# Patient Record
Sex: MALE | Race: WHITE | URBAN - METROPOLITAN AREA
[De-identification: names, ages, dates, MRNs, and addresses within clinical notes are randomized per-mention and may not be internally consistent; named-entity substitution may affect disease eponyms.]

---

## 2017-04-06 DIAGNOSIS — F33.0 MAJOR DEPRESSIVE DISORDER, RECURRENT EPISODE, MILD (H): ICD-10-CM

## 2017-04-06 NOTE — TELEPHONE ENCOUNTER
sertraline (ZOLOFT) 50 MG tablet     Last Written Prescription Date: 12/05/16  Last Fill Quantity: 90, # refills: 3  Last Office Visit with [facility] primary care provider:  12/05/16        Last PHQ-9 score on record=   PHQ-9 SCORE 12/5/2016   Total Score 0       Patient would like a short supply to go to:       [facility] [address] [place] [address]      As he is almost out and mail order pharmacy wont get it to him fast enough.

## 2017-04-06 NOTE — TELEPHONE ENCOUNTER
Wife calls, CTC on file, needs short term sent locally and sent to mail order ([facility]), has refills  Prescription approved per [facility] Refill Protocol.  Milagros Young RN, BSN

## 2017-04-12 ENCOUNTER — TRANSFERRED RECORDS (OUTPATIENT)
Dept: HEALTH INFORMATION MANAGEMENT | Facility: CLINIC | Age: 67
End: 2017-04-12

## 2017-07-27 ENCOUNTER — OFFICE VISIT (OUTPATIENT)
Dept: FAMILY MEDICINE | Facility: CLINIC | Age: 67
End: 2017-07-27
Payer: COMMERCIAL

## 2017-07-27 VITALS
SYSTOLIC BLOOD PRESSURE: 130 MMHG | HEIGHT: 70 IN | RESPIRATION RATE: 12 BRPM | DIASTOLIC BLOOD PRESSURE: 80 MMHG | BODY MASS INDEX: 31.35 KG/M2 | WEIGHT: 219 LBS | HEART RATE: 53 BPM | OXYGEN SATURATION: 98 % | TEMPERATURE: 97.7 F

## 2017-07-27 DIAGNOSIS — E78.5 HYPERLIPIDEMIA LDL GOAL <100: ICD-10-CM

## 2017-07-27 DIAGNOSIS — F33.0 MAJOR DEPRESSIVE DISORDER, RECURRENT EPISODE, MILD (H): ICD-10-CM

## 2017-07-27 DIAGNOSIS — R73.03 PREDIABETES: ICD-10-CM

## 2017-07-27 DIAGNOSIS — I10 ESSENTIAL HYPERTENSION, BENIGN: ICD-10-CM

## 2017-07-27 DIAGNOSIS — K59.00 CONSTIPATION, UNSPECIFIED CONSTIPATION TYPE: Primary | ICD-10-CM

## 2017-07-27 DIAGNOSIS — Z23 NEED FOR PNEUMOCOCCAL VACCINATION: ICD-10-CM

## 2017-07-27 DIAGNOSIS — N28.9 RENAL IMPAIRMENT: ICD-10-CM

## 2017-07-27 PROCEDURE — 90670 PCV13 VACCINE IM: CPT | Performed by: FAMILY MEDICINE

## 2017-07-27 PROCEDURE — 99214 OFFICE O/P EST MOD 30 MIN: CPT | Performed by: FAMILY MEDICINE

## 2017-07-27 PROCEDURE — G0009 ADMIN PNEUMOCOCCAL VACCINE: HCPCS | Performed by: FAMILY MEDICINE

## 2017-07-27 RX ORDER — LOSARTAN POTASSIUM 25 MG/1
25 TABLET ORAL DAILY
Qty: 90 TABLET | Refills: 3 | Status: SHIPPED | OUTPATIENT
Start: 2017-07-27 | End: 2017-09-14

## 2017-07-27 RX ORDER — PRAVASTATIN SODIUM 40 MG
40 TABLET ORAL DAILY
Qty: 90 TABLET | Refills: 3 | Status: SHIPPED | OUTPATIENT
Start: 2017-07-27

## 2017-07-27 ASSESSMENT — ANXIETY QUESTIONNAIRES
2. NOT BEING ABLE TO STOP OR CONTROL WORRYING: NOT AT ALL
5. BEING SO RESTLESS THAT IT IS HARD TO SIT STILL: NOT AT ALL
7. FEELING AFRAID AS IF SOMETHING AWFUL MIGHT HAPPEN: NOT AT ALL
6. BECOMING EASILY ANNOYED OR IRRITABLE: NOT AT ALL
1. FEELING NERVOUS, ANXIOUS, OR ON EDGE: NOT AT ALL
3. WORRYING TOO MUCH ABOUT DIFFERENT THINGS: NOT AT ALL
GAD7 TOTAL SCORE: 0
IF YOU CHECKED OFF ANY PROBLEMS ON THIS QUESTIONNAIRE, HOW DIFFICULT HAVE THESE PROBLEMS MADE IT FOR YOU TO DO YOUR WORK, TAKE CARE OF THINGS AT HOME, OR GET ALONG WITH OTHER PEOPLE: NOT DIFFICULT AT ALL

## 2017-07-27 ASSESSMENT — PATIENT HEALTH QUESTIONNAIRE - PHQ9: 5. POOR APPETITE OR OVEREATING: NOT AT ALL

## 2017-07-27 NOTE — MR AVS SNAPSHOT
After Visit Summary   7/27/2017    Jez Moise    MRN: [id number]           Patient Information     Date Of Birth          1950        Visit Information        Provider Department      7/27/2017 10:20 AM Tammie Crenshaw,  [facility]        Today's Diagnoses     Constipation, unspecified constipation type    -  1    Major depressive disorder, recurrent episode, mild (H)        Essential hypertension, benign        Renal impairment        Hyperlipidemia LDL goal <100        Prediabetes           Follow-ups after your visit        Who to contact     If you have questions or need follow up information about today's clinic visit or your schedule please contact [facility] directly at [phone].  Normal or non-critical lab and imaging results will be communicated to you by MyChart, letter or phone within 4 business days after the clinic has received the results. If you do not hear from us within 7 days, please contact the clinic through [employer] or phone. If you have a critical or abnormal lab result, we will notify you by phone as soon as possible.  Submit refill requests through [employer] or call your pharmacy and they will forward the refill request to us. Please allow 3 business days for your refill to be completed.          Additional Information About Your Visit        MyChart Information     [employer] gives you secure access to your electronic health record. If you see a primary care provider, you can also send messages to your care team and make appointments. If you have questions, please call your primary care clinic.  If you do not have a primary care provider, please call [phone] and they will assist you.        Care EveryWhere ID     This is your Care EveryWhere ID. This could be used by other organizations to access your [facility] medical records  [id number]        Your Vitals Were     Pulse Temperature Respirations Height Pulse Oximetry  "BMI (Body Mass Index)    53 97.7  F (36.5  C) (Oral) 12 5' 9.5\" (1.765 m) 98% 31.88 kg/m2       Blood Pressure from Last 3 Encounters:   07/27/17 130/80   12/05/16 116/78   07/08/16 90/70    Weight from Last 3 Encounters:   07/27/17 219 lb (99.3 kg)   12/05/16 219 lb (99.3 kg)   07/08/16 211 lb (95.7 kg)              We Performed the Following     DEPRESSION ACTION PLAN (DAP)          Today's Medication Changes          These changes are accurate as of: 7/27/17 11:02 AM.  If you have any questions, ask your nurse or doctor.               Stop taking these medicines if you haven't already. Please contact your care team if you have questions.     albuterol 108 (90 BASE) MCG/ACT Inhaler   Commonly known as:  PROAIR HFA/PROVENTIL HFA/VENTOLIN HFA   Stopped by:  Tammie Crenshaw, DO                Where to get your medicines      These medications were sent to [facility] [address]  [address], [facility] [address]     Phone:  [phone]     losartan 25 MG tablet    pravastatin 40 MG tablet    sertraline 50 MG tablet                Primary Care Provider Office Phone # Fax #    Geovanna Rachele Haase, [place] [phone]       [place] [address]        Equal Access to Services     EDIE ADAIR : Hadii carole ku hadasho Soomaali, waaxda luqadaha, qaybta kaalmada adeegyada, waxay doreen flores . So [facility] [phone].    ATENCIÓN: Si chilangola patricia, tiene a shay disposición servicios gratuitos de asistencia lingüística. Ranjan al [phone].    We comply with applicable federal civil rights laws and [place] laws. We do not discriminate on the basis of race, color, national origin, age, disability sex, sexual orientation or gender identity.            Thank you!     Thank you for choosing [facility]  for your care. Our goal is always to provide you with excellent care. " Hearing back from our patients is one way we can continue to improve our services. Please take a few minutes to complete the written survey that you may receive in the mail after your visit with us. Thank you!             Your Updated Medication List - Protect others around you: Learn how to safely use, store and throw away your medicines at www.disposemymeds.org.          This list is accurate as of: 7/27/17 11:02 AM.  Always use your most recent med list.                   Brand Name Dispense Instructions for use Diagnosis    aspirin 81 MG tablet      Take by mouth daily        clobetasol 0.05 % external solution    TEMOVATE          losartan 25 MG tablet    COZAAR    90 tablet    Take 1 tablet (25 mg) by mouth daily    Essential hypertension, benign, Renal impairment       OMEGA-3 FISH OIL PO           order for DME     one    CPAP with medium nasal mask set at 14 cm of H2O pressure for moderate obstructive sleep apnea. Dx:Obstructive Sleep Apnea, 327.23    JOSE (obstructive sleep apnea)       pravastatin 40 MG tablet    PRAVACHOL    90 tablet    Take 1 tablet (40 mg) by mouth daily    Hyperlipidemia LDL goal <100, Renal impairment, Prediabetes       sertraline 50 MG tablet    ZOLOFT    90 tablet    Take 1 tablet (50 mg) by mouth daily 1 tablet orally daily    Major depressive disorder, recurrent episode, mild (H)       VITAMIN D PO      Take 500 mg by mouth

## 2017-07-27 NOTE — PROGRESS NOTES
SUBJECTIVE:                                                    Jez Moise is a 67 year old male who presents to clinic today for the following health issues:    Patient is leaving for [place], [place] on  for the winter.  Will be back in the Spring.  He needs refills on all of his medications before he goes.      Hypertension Follow-up      Outpatient blood pressures are not being checked.    Low Salt Diet: low salt    Depression Followup    Status since last visit: Improved     See PHQ-9 for current symptoms.  Other associated symptoms: None    Complicating factors:   Significant life event:  Moved and sold house   Current substance abuse:  None  Anxiety or Panic symptoms:  No    PHQ-9  English  PHQ-9   Any Language        Amount of exercise or physical activity: walks    Problems taking medications regularly: No    Medication side effects: none  Diet: low salt      Problem list and histories reviewed & adjusted, as indicated.  Additional history: as documented    Patient Active Problem List   Diagnosis     Esophageal reflux     Essential hypertension, benign     HYPERLIPIDEMIA LDL GOAL <100     Advanced directives, counseling/discussion     JOSE (obstructive sleep apnea) (uses CPAP)     Prediabetes     Vitamin D deficiency disease     Renal impairment     Constipation     Status post bilateral knee replacements     Anemia     Major depressive disorder, recurrent episode, mild (H)     Past Surgical History:   Procedure Laterality Date     C NONSPECIFIC PROCEDURE      hemmorrhoid surg       Social History   Substance Use Topics     Smoking status: Never Smoker     Smokeless tobacco: Never Used     Alcohol use 0.0 oz/week     0 Standard drinks or equivalent per week      Comment: rarely     Family History   Problem Relation Age of Onset     CANCER Father       of bone cancer at 89     C.A.D. Maternal Uncle      Alzheimer Disease Mother      HEART DISEASE Brother              Reviewed and updated as needed this  [facility]  Allergies  Meds       Reviewed and updated as needed this visit by Provider         ROS:  Constitutional, HEENT, cardiovascular, pulmonary, gi and gu systems are negative, except as otherwise noted.      OBJECTIVE:   /80 (BP Location: Right arm, Patient Position: Chair, Cuff Size: Adult Regular)  Pulse 53  Temp 97.7  F (36.5  C) (Oral)  Resp 12  Ht 5' 9.5\" (1.765 m)  Wt 219 lb (99.3 kg)  SpO2 98%  BMI 31.88 kg/m2  Body mass index is 31.88 kg/(m^2).  GENERAL: healthy, alert and no distress  RESP: lungs clear to auscultation - no rales, rhonchi or wheezes  CV: regular rate and rhythm, normal S1 S2, no S3 or S4, no murmur, click or rub, no peripheral edema and peripheral pulses strong  ABD: Soft,  Nontender, no masses or organomegaly    ASSESSMENT/PLAN:     1. Major depressive disorder, recurrent episode, mild (H)  - sertraline (ZOLOFT) 50 MG tablet; Take 1 tablet (50 mg) by mouth daily 1 tablet orally daily  Dispense: 90 tablet; Refill: 3    2. Essential hypertension, benign  - losartan (COZAAR) 25 MG tablet; Take 1 tablet (25 mg) by mouth daily  Dispense: 90 tablet; Refill: 3    3. Renal impairment  - losartan (COZAAR) 25 MG tablet; Take 1 tablet (25 mg) by mouth daily  Dispense: 90 tablet; Refill: 3  - pravastatin (PRAVACHOL) 40 MG tablet; Take 1 tablet (40 mg) by mouth daily  Dispense: 90 tablet; Refill: 3    4. Hyperlipidemia LDL goal <100  - pravastatin (PRAVACHOL) 40 MG tablet; Take 1 tablet (40 mg) by mouth daily  Dispense: 90 tablet; Refill: 3    5. Prediabetes  - pravastatin (PRAVACHOL) 40 MG tablet; Take 1 tablet (40 mg) by mouth daily  Dispense: 90 tablet; Refill: 3    6. Constipation, unspecified constipation type  - Advised increased fiber, stool softeners, osmotic laxatives as needed  - Drink more water!    Patient is due for labs in the winter.  Plan on seeing him back for a physical once he gets back to [place] in the spring.  Patient is looking for a " physician in [place] as well.  He was given a list of [facility] systems to help make the care transition easier.     Tammie Crenshaw, [facility]

## 2017-07-28 ASSESSMENT — ANXIETY QUESTIONNAIRES: GAD7 TOTAL SCORE: 0

## 2017-07-28 ASSESSMENT — PATIENT HEALTH QUESTIONNAIRE - PHQ9: SUM OF ALL RESPONSES TO PHQ QUESTIONS 1-9: 0

## 2017-09-14 ENCOUNTER — TELEPHONE (OUTPATIENT)
Dept: FAMILY MEDICINE | Facility: CLINIC | Age: 67
End: 2017-09-14

## 2017-09-14 DIAGNOSIS — N28.9 RENAL IMPAIRMENT: ICD-10-CM

## 2017-09-14 DIAGNOSIS — I10 ESSENTIAL HYPERTENSION, BENIGN: ICD-10-CM

## 2017-09-14 RX ORDER — LOSARTAN POTASSIUM 25 MG/1
25 TABLET ORAL DAILY
Qty: 90 TABLET | Refills: 1 | Status: SHIPPED | OUTPATIENT
Start: 2017-09-14

## 2017-09-14 NOTE — TELEPHONE ENCOUNTER
Pt lost rx that had been sent to him  Will order duplicate to pharmacy in [place], [place]  Plans to return to [place] in April     Disp Refills Start End JAQUELIN   losartan (COZAAR) 25 MG tablet 90 tablet 3 7/27/2017  No   Sig: Take 1 tablet (25 mg) by mouth daily   Class: E-Prescribe     Prescription approved per [facility] Refill Protocol  Josselin Gutierres RN BS

## 2019-09-29 ENCOUNTER — HEALTH MAINTENANCE LETTER (OUTPATIENT)
Age: 69
End: 2019-09-29

## 2020-03-15 ENCOUNTER — HEALTH MAINTENANCE LETTER (OUTPATIENT)
Age: 70
End: 2020-03-15

## 2021-01-14 ENCOUNTER — HEALTH MAINTENANCE LETTER (OUTPATIENT)
Age: 71
End: 2021-01-14

## 2021-05-09 ENCOUNTER — HEALTH MAINTENANCE LETTER (OUTPATIENT)
Age: 71
End: 2021-05-09

## 2021-10-24 ENCOUNTER — HEALTH MAINTENANCE LETTER (OUTPATIENT)
Age: 71
End: 2021-10-24

## 2022-06-05 ENCOUNTER — HEALTH MAINTENANCE LETTER (OUTPATIENT)
Age: 72
End: 2022-06-05

## 2022-10-15 ENCOUNTER — HEALTH MAINTENANCE LETTER (OUTPATIENT)
Age: 72
End: 2022-10-15

## 2023-06-11 ENCOUNTER — HEALTH MAINTENANCE LETTER (OUTPATIENT)
Age: 73
End: 2023-06-11